# Patient Record
Sex: MALE | Race: WHITE | NOT HISPANIC OR LATINO | Employment: OTHER | URBAN - METROPOLITAN AREA
[De-identification: names, ages, dates, MRNs, and addresses within clinical notes are randomized per-mention and may not be internally consistent; named-entity substitution may affect disease eponyms.]

---

## 2017-09-19 ENCOUNTER — TRANSFERRED RECORDS (OUTPATIENT)
Dept: HEALTH INFORMATION MANAGEMENT | Facility: CLINIC | Age: 72
End: 2017-09-19

## 2017-09-19 ENCOUNTER — TELEPHONE (OUTPATIENT)
Dept: OPHTHALMOLOGY | Facility: CLINIC | Age: 72
End: 2017-09-19

## 2017-09-19 NOTE — TELEPHONE ENCOUNTER
Pt referred by dr. Mills for retina hemorrhage and possible TPA/Gas bubble  Dr. meneses reviewed with dr. Mills  Pt scheduled this Friday (per dr. Meneses)  Pt aware of date/time/location and has main number for generalized questions  Brennan Mccauley RN 4:13 PM 09/19/17

## 2017-09-22 ENCOUNTER — OFFICE VISIT (OUTPATIENT)
Dept: OPHTHALMOLOGY | Facility: CLINIC | Age: 72
End: 2017-09-22
Attending: OPHTHALMOLOGY
Payer: COMMERCIAL

## 2017-09-22 DIAGNOSIS — Z96.1 PSEUDOPHAKIA OF BOTH EYES: ICD-10-CM

## 2017-09-22 DIAGNOSIS — H40.001 GLAUCOMA SUSPECT OF RIGHT EYE: ICD-10-CM

## 2017-09-22 DIAGNOSIS — H35.3231 EXUDATIVE AGE-RELATED MACULAR DEGENERATION, BILATERAL, WITH ACTIVE CHOROIDAL NEOVASCULARIZATION (H): Primary | ICD-10-CM

## 2017-09-22 PROCEDURE — 99214 OFFICE O/P EST MOD 30 MIN: CPT | Mod: ZF

## 2017-09-22 PROCEDURE — 92133 CPTRZD OPH DX IMG PST SGM ON: CPT | Mod: ZF | Performed by: OPHTHALMOLOGY

## 2017-09-22 PROCEDURE — 92250 FUNDUS PHOTOGRAPHY W/I&R: CPT | Mod: ZF | Performed by: OPHTHALMOLOGY

## 2017-09-22 PROCEDURE — 92240 ICG ANGIOGRAPHY I&R UNI/BI: CPT | Mod: ZF | Performed by: OPHTHALMOLOGY

## 2017-09-22 PROCEDURE — 92235 FLUORESCEIN ANGRPH MLTIFRAME: CPT | Mod: ZF | Performed by: OPHTHALMOLOGY

## 2017-09-22 PROCEDURE — 65800 DRAINAGE OF EYE: CPT | Mod: ZF | Performed by: OPHTHALMOLOGY

## 2017-09-22 PROCEDURE — 67028 INJECTION EYE DRUG: CPT | Mod: ZF | Performed by: OPHTHALMOLOGY

## 2017-09-22 PROCEDURE — 92134 CPTRZ OPH DX IMG PST SGM RTA: CPT | Mod: ZF | Performed by: OPHTHALMOLOGY

## 2017-09-22 PROCEDURE — 25000128 H RX IP 250 OP 636: Mod: ZF

## 2017-09-22 RX ORDER — HYDROXYUREA 500 MG/1
CAPSULE ORAL
COMMUNITY
Start: 2017-08-07

## 2017-09-22 RX ORDER — LISINOPRIL 20 MG/1
10 TABLET ORAL
COMMUNITY

## 2017-09-22 RX ORDER — NITROGLYCERIN 120 MG/1
1 PATCH TRANSDERMAL
COMMUNITY

## 2017-09-22 RX ORDER — OXYBUTYNIN CHLORIDE 5 MG/1
5 TABLET ORAL
COMMUNITY

## 2017-09-22 RX ORDER — PRAVASTATIN SODIUM 40 MG
40 TABLET ORAL
COMMUNITY

## 2017-09-22 RX ORDER — TRAZODONE HYDROCHLORIDE 100 MG/1
400 TABLET ORAL
COMMUNITY

## 2017-09-22 RX ORDER — LORAZEPAM 2 MG/1
2 TABLET ORAL
COMMUNITY

## 2017-09-22 RX ORDER — DULOXETIN HYDROCHLORIDE 60 MG/1
60 CAPSULE, DELAYED RELEASE ORAL
COMMUNITY

## 2017-09-22 RX ORDER — CLOPIDOGREL BISULFATE 75 MG/1
75 TABLET ORAL
COMMUNITY

## 2017-09-22 RX ORDER — TRIAMCINOLONE ACETONIDE 1 MG/G
OINTMENT TOPICAL
COMMUNITY

## 2017-09-22 RX ORDER — CARVEDILOL 25 MG/1
25 TABLET ORAL
COMMUNITY

## 2017-09-22 RX ORDER — LISINOPRIL 2.5 MG/1
2.5 TABLET ORAL
COMMUNITY
Start: 2015-09-09

## 2017-09-22 ASSESSMENT — CUP TO DISC RATIO
OS_RATIO: 0.4
OD_RATIO: 0.5

## 2017-09-22 ASSESSMENT — EXTERNAL EXAM - LEFT EYE: OS_EXAM: NORMAL

## 2017-09-22 ASSESSMENT — VISUAL ACUITY
CORRECTION_TYPE: GLASSES
OD_SC: 2/200
OS_SC: 20/250
METHOD: SNELLEN - LINEAR

## 2017-09-22 ASSESSMENT — CONF VISUAL FIELD
OD_INFERIOR_TEMPORAL_RESTRICTION: 1
OD_INFERIOR_NASAL_RESTRICTION: 3
OS_SUPERIOR_NASAL_RESTRICTION: 3
OS_INFERIOR_TEMPORAL_RESTRICTION: 1
OD_SUPERIOR_TEMPORAL_RESTRICTION: 1
OS_SUPERIOR_TEMPORAL_RESTRICTION: 3
OS_INFERIOR_NASAL_RESTRICTION: 1
OD_SUPERIOR_NASAL_RESTRICTION: 3

## 2017-09-22 ASSESSMENT — TONOMETRY
IOP_METHOD: TONOPEN
OD_IOP_MMHG: 15
OS_IOP_MMHG: 16

## 2017-09-22 ASSESSMENT — SLIT LAMP EXAM - LIDS
COMMENTS: NORMAL
COMMENTS: NORMAL

## 2017-09-22 ASSESSMENT — EXTERNAL EXAM - RIGHT EYE: OD_EXAM: NORMAL

## 2017-09-22 NOTE — MR AVS SNAPSHOT
After Visit Summary   2017    Justin Vizcaino    MRN: [id number]           Patient Information     Date Of Birth          1945        Visit Information        Provider Department      2017 8:15 AM Sulema Fraga [facility]        Today's Diagnoses     Exudative age-related macular degeneration, bilateral, with active choroidal neovascularization (H)    -  1    Pseudophakia of both eyes        Glaucoma suspect of right eye           Follow-ups after your visit        Who to contact     Please call your clinic at [phone] to:    Ask questions about your health    Make or cancel appointments    Discuss your medicines    Learn about your test results    Speak to your doctor   If you have compliments or concerns about an experience at your clinic, or if you wish to file a complaint, please contact [facility] Patient Relations at [phone] or email us at [email or web address]         Additional Information About Your Visit        MyChart Information     [employer] is an electronic gateway that provides easy, online access to your medical records. With [employer], you can request a clinic appointment, read your test results, renew a prescription or communicate with your care team.     To sign up for [employer] visit the website at [email or web address]   You will be asked to enter the access code listed below, as well as some personal information. Please follow the directions to create your username and password.     Your access code is: [email or web address]  Expires: 2017  6:31 AM     Your access code will  in 90 days. If you need help or a new code, please contact your [facility] or call [phone] for assistance.        Care EveryWhere ID     This is your Care EveryWhere ID. This could be used by other organizations to access your [facility] medical records  [id number]         Blood Pressure from Last  3 Encounters:   No data found for BP    Weight from Last 3 Encounters:   No data found for Wt              We Performed the Following     Fluorescein Angiography OU (both eyes)     Fundus Photos OU (both eyes)     ICG Angiography OU (both eyes)     Intravitreal Injection OD     OCT Optic Nerve RNFL Spectralis OU (both eyes)     OCT Retina Spectralis OU (both eyes)          Today's Medication Changes          These changes are accurate as of: 9/22/17 12:23 PM.  If you have any questions, ask your nurse or doctor.               Start taking these medicines.        Dose/Directions    alteplase 0.05 MG/0.1 ML Inj   Used for:  Pseudophakia of both eyes, Exudative age-related macular degeneration, bilateral, with active choroidal neovascularization (H), Glaucoma suspect of right eye   Started by:  Sulema Fraga MD        Dose:  0.1 mL   Inject 0.1 mLs (0.05 mg) into the eye once for 1 dose   Quantity:  0.1 mL   Refills:  0            Where to get your medicines      Some of these will need a paper prescription and others can be bought over the counter.  Ask your nurse if you have questions.     Bring a paper prescription for each of these medications     alteplase 0.05 MG/0.1 ML Inj                Primary Care Provider    Physician No Ref-Primary       No address on file        Equal Access to Services     YASH CAMACHO AH: Eden Salgado, waglen wagoner, qazuly kaalmada vikas, aakash ordaz. So [facility] [phone].    ATENCIÓN: Si habla español, tiene a miller disposición servicios gratuitos de asistencia lingüística. Llame al [phone].    We comply with applicable federal civil rights laws and [place] laws. We do not discriminate on the basis of race, color, national origin, age, disability sex, sexual orientation or gender identity.            Thank you!     Thank you for choosing EYE CLINIC  for your care. Our goal is always to provide you with excellent care.  Hearing back from our patients is one way we can continue to improve our services. Please take a few minutes to complete the written survey that you may receive in the mail after your visit with us. Thank you!             Your Updated Medication List - Protect others around you: Learn how to safely use, store and throw away your medicines at www.disposemymeds.org.          This list is accurate as of: 9/22/17 12:23 PM.  Always use your most recent med list.                   Brand Name Dispense Instructions for use Diagnosis    alteplase 0.05 MG/0.1 ML Inj     0.1 mL    Inject 0.1 mLs (0.05 mg) into the eye once for 1 dose    Pseudophakia of both eyes, Exudative age-related macular degeneration, bilateral, with active choroidal neovascularization (H), Glaucoma suspect of right eye       aspirin 81 MG EC tablet      Take 162 mg by mouth as needed        B-12 1000 MCG Caps      Take 1,000 mcg by mouth        carvedilol 25 MG tablet    COREG     Take 25 mg by mouth        clopidogrel 75 MG tablet    PLAVIX     Take 75 mg by mouth        DULoxetine 60 MG EC capsule    CYMBALTA     Take 60 mg by mouth        hydroxyurea 500 MG capsule CHEMO    HYDREA     Do not crush. Take one capsule by mouth twice a day and an additional 500 mg capsule 3 times a week.        * lisinopril 20 MG tablet    PRINIVIL/ZESTRIL     Take 10 mg by mouth        * lisinopril 2.5 MG tablet    PRINIVIL/Zestril     Take 2.5 mg by mouth        LORazepam 2 MG tablet    ATIVAN     Take 2 mg by mouth        nitroGLYcerin 0.6 MG/HR 24 hr patch    NITRODUR     1 patch        oxybutynin 5 MG tablet    DITROPAN     Take 5 mg by mouth        pravastatin 40 MG tablet    PRAVACHOL     Take 40 mg by mouth        traZODone 100 MG tablet    DESYREL     Take 400 mg by mouth        triamcinolone 0.1 % ointment    KENALOG          * Notice:  This list has 2 medication(s) that are the same as other medications prescribed for you. Read the directions carefully, and ask  your doctor or other care provider to review them with you.

## 2017-09-22 NOTE — PROGRESS NOTES
CC -   Wet AMD     INTERVAL HISTORY - Initial visit.  Referred by Dr. Mills for SRH OD.  Noted severe vision loss 9/16/17 OD, found to have central SRH on visit to Dr. Mills when seen 9/19.  Had been receiving Avastin OS monthly x 6 months, was due to get shot 9/14/17 OS but decided to observe given lack of change or improvement.  Had Avastin OD by Gene 9/17/17    HPI -   Justin Vizcaino is a  72 year old year-old patient referred by Dr Leonel Mills for subretinal hemorrhage OD.  Had Wet AMD OS was receiving monthly Avastin.  On plavix for cardiac disease.  PMH: Leukemia on Hydroxyurea  Medications: Plavix, ASA      PAST OCULAR SURGERY  LASIK OU  ~2012   CEIOL OU ~2012    RETINAL IMAGING:  OCT 09/22/17   OD - Subretinal hemorrhage and Sub RPE fluid  OS - Fibrovascular PED, mild SR lucency no definite fluid    FA 9/22/17  OD - (transit) normal filling,  mild late leakage in area of blocking from SRH  OS - normal filling, staining/leakage from FVPD    ICG  OD - (transit) normal filling, blockage from SRH, no leakage/no polyps  OS - normal filling, leakage from CNVM in macula, no polyps    OCT RNFL 09/22/17  OD: wnl  OS: wnl    FAF 09/22/17   OD: hypofluorescence in area of hemorrhage  OS: hyperfluorescence/irregular consistent with FVPED    Fundus photos 09/22/17  Consistent with exam      ASSESSMENT & PLAN    1. Wet AMD OD with SRH   - new onset 9/16/17 by history   - s/p Avastin 9/19/17      - OD was better eye, now severely decreased VA   - Discussed rba of tpa/gas   - RD, need for surgeries, vision loss, blindness   - gas bubble restrictions     - Patient elects to proceed      2. Wet AMD OS   - may be end stage   - s/p Avastin monthly by Gene x 6   - last injection (#6) ~8/2017 (~1 month per patient)   - no subjective or objective change per patient   - no fluid on OCT today   - observing for now      3. PVD OU   - S/Sx RD      4. Pseudophakia with some PCO OU     -Monitor      5.  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  I personally obtained the chief complaint(s) and history of present illness.  I confirmed and edited as necessary the review of systems, past medical/surgical history, family history, social history, and examination findings as documented by others; and I examined the patient myself.  I personally reviewed the relevant tests, images, and reports as documented above.  I formulated and edited as necessary the assessment and plan and discussed the findings and management plan with the patient and family and I was present for the entire procedure performed by the resident/fellow.        Sulema Fraga MD, PhD  , Vitreoretinal Surgery  Department of Ophthalmology  [facility]

## 2017-09-22 NOTE — LETTER
9/22/2017       RE: Justin Vizcaino  [address] [place] [id number]     Dear Colleague,    Thank you for referring your patient, Justin Vizcaino, [facility] Please see a copy of my visit note below.    CC -   Wet AMD     INTERVAL HISTORY - Initial visit.  Referred by Dr. Mills for SRH OD.  Noted severe vision loss 9/16/17 OD, found to have central SRH on visit to Dr. Mills when seen 9/19.  Had been receiving Avastin OS monthly x 6 months, was due to get shot 9/14/17 OS but decided to observe given lack of change or improvement.  Had Avastin OD by Gene 9/17/17    HPI -   Justin Vizcaino is a  72 year old year-old patient referred by Dr Leonel Mills for subretinal hemorrhage OD.  Had Wet AMD OS was receiving monthly Avastin.  On plavix for cardiac disease.  PMH: Leukemia on Hydroxyurea  Medications: Plavix, ASA      PAST OCULAR SURGERY  LASIK OU  ~2012   CEIOL OU ~2012    RETINAL IMAGING:  OCT 09/22/17   OD - Subretinal hemorrhage and Sub RPE fluid  OS - Fibrovascular PED, mild SR lucency no definite fluid    FA 9/22/17  OD - (transit) normal filling,  mild late leakage in area of blocking from SRH  OS - normal filling, staining/leakage from FVPD    ICG  OD - (transit) normal filling, blockage from SRH, no leakage/no polyps  OS - normal filling, leakage from CNVM in macula, no polyps    OCT RNFL 09/22/17  OD: wnl  OS: wnl    FAF 09/22/17   OD: hypofluorescence in area of hemorrhage  OS: hyperfluorescence/irregular consistent with FVPED    Fundus photos 09/22/17  Consistent with exam      ASSESSMENT & PLAN    1. Wet AMD OD with SRH   - new onset 9/16/17 by history   - s/p Avastin 9/19/17      - OD was better eye, now severely decreased VA   - Discussed rba of tpa/gas   - RD, need for surgeries, vision loss, blindness   - gas bubble restrictions     - Patient elects to proceed    2. Wet AMD OS   - may be end stage   - s/p Avastin monthly by  Gene x 6   - last injection (#6) ~8/2017 (~1 month per patient)   - no subjective or objective change per patient   - no fluid on OCT today   - observing for now    3. PVD OU   - S/Sx RD    4. Pseudophakia with some PCO OU     -Monitor  5.  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  I personally obtained the chief complaint(s) and history of present illness.  I confirmed and edited as necessary the review of systems, past medical/surgical history, family history, social history, and examination findings as documented by others; and I examined the patient myself.  I personally reviewed the relevant tests, images, and reports as documented above.  I formulated and edited as necessary the assessment and plan and discussed the findings and management plan with the patient and family and I was present for the entire procedure performed by the resident/fellow.  Sulema Fraga MD, PhD      Again, thank you for allowing me to participate in the care of your patient.      Sincerely,    Sulema Fraga MD

## 2017-09-22 NOTE — LETTER
9/22/2017      RE: Justin Vizcaino  [address] [place] [id number]       CC -   Wet AMD     INTERVAL HISTORY - Initial visit.  Referred by Dr. Mills for SRH OD.  Noted severe vision loss 9/16/17 OD, found to have central SRH on visit to Dr. Mills when seen 9/19.  Had been receiving Avastin OS monthly x 6 months, was due to get shot 9/14/17 OS but decided to observe given lack of change or improvement.  Had Avastin OD by Gene 9/17/17    HPI -   Justinkaylah Vizcaino is a  72 year old year-old patient referred by Dr Leonel Mills for subretinal hemorrhage OD.  Had Wet AMD OS was receiving monthly Avastin.  On plavix for cardiac disease.  PMH: Leukemia on Hydroxyurea  Medications: Plavix, ASA      PAST OCULAR SURGERY  LASIK OU  ~2012   CEIOL OU ~2012    RETINAL IMAGING:  OCT 09/22/17   OD - Subretinal hemorrhage and Sub RPE fluid  OS - Fibrovascular PED, mild SR lucency no definite fluid    FA 9/22/17  OD - (transit) normal filling,  mild late leakage in area of blocking from SRH  OS - normal filling, staining/leakage from FVPD    ICG  OD - (transit) normal filling, blockage from SRH, no leakage/no polyps  OS - normal filling, leakage from CNVM in macula, no polyps    OCT RNFL 09/22/17  OD: wnl  OS: wnl    FAF 09/22/17   OD: hypofluorescence in area of hemorrhage  OS: hyperfluorescence/irregular consistent with FVPED    Fundus photos 09/22/17  Consistent with exam      ASSESSMENT & PLAN    1. Wet AMD OD with SRH   - new onset 9/16/17 by history   - s/p Avastin 9/19/17      - OD was better eye, now severely decreased VA   - Discussed rba of tpa/gas   - RD, need for surgeries, vision loss, blindness   - gas bubble restrictions     - Patient elects to proceed      2. Wet AMD OS   - may be end stage   - s/p Avastin monthly by Gene x 6   - last injection (#6) ~8/2017 (~1 month per patient)   - no subjective or objective change per patient   - no fluid on OCT today   - observing for now      3. PVD OU   - S/Sx  RD      4. Pseudophakia with some PCO OU     -Monitor      5.  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  I personally obtained the chief complaint(s) and history of present illness.  I confirmed and edited as necessary the review of systems, past medical/surgical history, family history, social history, and examination findings as documented by others; and I examined the patient myself.  I personally reviewed the relevant tests, images, and reports as documented above.  I formulated and edited as necessary the assessment and plan and discussed the findings and management plan with the patient and family and I was present for the entire procedure performed by the resident/fellow. Sulema Fraga MD, PhD      Sulema Fraga MD, PhD  , Vitreoretinal Surgery  Department of Ophthalmology & Visual Neurosciences  [facility]

## 2017-09-22 NOTE — NURSING NOTE
Chief Complaints and History of Present Illnesses   Patient presents with     Retinal Evaluation     Retina hemorrhage and possible TPA/Gas bubble Right Eye     HPI    Affected eye(s):  Right   Symptoms:           Do you have eye pain now?:  No      Comments:  Pt here for Retinal Hemorrhage in RE. Pt woke up on Saturday morning with no vision in his RE. No eye pain. No flashes or floaters. Pt states that his LE has had limited vision for the past 9 months. Pt has been having regular injections in LE, last injection was 6 weeks ago.    Sheela DUNCAN September 22, 2017 8:17 AM

## 2017-09-25 ENCOUNTER — TRANSFERRED RECORDS (OUTPATIENT)
Dept: HEALTH INFORMATION MANAGEMENT | Facility: CLINIC | Age: 72
End: 2017-09-25

## 2017-11-17 ENCOUNTER — TRANSFERRED RECORDS (OUTPATIENT)
Dept: HEALTH INFORMATION MANAGEMENT | Facility: CLINIC | Age: 72
End: 2017-11-17

## 2017-11-17 ENCOUNTER — MEDICAL CORRESPONDENCE (OUTPATIENT)
Dept: HEALTH INFORMATION MANAGEMENT | Facility: CLINIC | Age: 72
End: 2017-11-17

## 2017-11-29 ENCOUNTER — TRANSFERRED RECORDS (OUTPATIENT)
Dept: HEALTH INFORMATION MANAGEMENT | Facility: CLINIC | Age: 72
End: 2017-11-29

## 2017-11-30 ENCOUNTER — MEDICAL CORRESPONDENCE (OUTPATIENT)
Dept: HEALTH INFORMATION MANAGEMENT | Facility: CLINIC | Age: 72
End: 2017-11-30

## 2017-11-30 ENCOUNTER — TRANSFERRED RECORDS (OUTPATIENT)
Dept: HEALTH INFORMATION MANAGEMENT | Facility: CLINIC | Age: 72
End: 2017-11-30

## 2017-12-04 ENCOUNTER — OFFICE VISIT (OUTPATIENT)
Dept: OPHTHALMOLOGY | Facility: CLINIC | Age: 72
End: 2017-12-04
Attending: OPHTHALMOLOGY

## 2017-12-04 DIAGNOSIS — H35.3211 EXUDATIVE AGE-RELATED MACULAR DEGENERATION OF RIGHT EYE WITH ACTIVE CHOROIDAL NEOVASCULARIZATION (H): ICD-10-CM

## 2017-12-04 DIAGNOSIS — H35.61 SUBRETINAL HEMORRHAGE OF RIGHT EYE: Primary | ICD-10-CM

## 2017-12-04 PROCEDURE — 99212 OFFICE O/P EST SF 10 MIN: CPT | Mod: 25,ZF

## 2017-12-04 PROCEDURE — 67028 INJECTION EYE DRUG: CPT | Mod: ZF | Performed by: OPHTHALMOLOGY

## 2017-12-04 PROCEDURE — 92134 CPTRZ OPH DX IMG PST SGM RTA: CPT | Mod: ZF | Performed by: OPHTHALMOLOGY

## 2017-12-04 PROCEDURE — 92250 FUNDUS PHOTOGRAPHY W/I&R: CPT | Mod: 59,ZF | Performed by: OPHTHALMOLOGY

## 2017-12-04 PROCEDURE — 25000128 H RX IP 250 OP 636: Mod: ZF

## 2017-12-04 RX ORDER — LIDOCAINE HYDROCHLORIDE 20 MG/ML
0.05 INJECTION, SOLUTION INFILTRATION; PERINEURAL ONCE
Status: ACTIVE | OUTPATIENT
Start: 2017-12-04

## 2017-12-04 ASSESSMENT — VISUAL ACUITY
OD_SC: HM
METHOD: SNELLEN - LINEAR
OS_SC: 20/200

## 2017-12-04 ASSESSMENT — TONOMETRY
IOP_METHOD: TONOPEN
OS_IOP_MMHG: 20
OD_IOP_MMHG: 18

## 2017-12-04 ASSESSMENT — CUP TO DISC RATIO
OD_RATIO: 0.5
OS_RATIO: 0.4

## 2017-12-04 ASSESSMENT — EXTERNAL EXAM - RIGHT EYE: OD_EXAM: NORMAL

## 2017-12-04 ASSESSMENT — SLIT LAMP EXAM - LIDS
COMMENTS: NORMAL
COMMENTS: NORMAL

## 2017-12-04 ASSESSMENT — EXTERNAL EXAM - LEFT EYE: OS_EXAM: NORMAL

## 2017-12-04 NOTE — LETTER
12/4/2017       RE: Justin Vizcaino  [address] [place] [id number]     Dear Colleague,    Thank you for referring your patient, Justin Vizcaino, to the EYE CLINIC at [facility]. Please see a copy of my visit note below.    CC -   Wet AMD and SRH OD       INTERVAL HISTORY - Pt seen by Dr Mills 11-29-17 and found to have another bleed that started on 11-26-17.  VA had improved to 20/150 after last pneumatic Tx then worsened suddenly.  Had Avastin 2 weeks prior by Gene, planned again in 2 weeks.        HPI -   Justin Vizcaino is a  72 year old year-old patient referred by Dr Leonel Mills for recurrent subretinal hemorrhage OD.  Initial SRH 9/22/17 Tx with TPA & C3F8 in clinic, VA was 2/200 after 1st SRH, VA improved to 20/150 after first Tx, but had recurrence and VA reduced to HM.  Has Wet AMD OS was receiving monthly Avastin.  On plavix for cardiac disease.  PMH: Leukemia on Hydroxyurea   Medications: Plavix, ASA    PAST OCULAR SURGERY  LASIK OU  ~2012   CEIOL OU ~2012    RETINAL IMAGING:  OCT 12/4/2017  OD - large SRH and small PED under macula (?FV vs heme PED)  OS - Fibrovascular PED, mild SR lucency no definite fluid    FA 9/22/17  OD - (transit) normal filling,  mild late leakage in area of blocking from SRH  OS - normal filling, staining/leakage from FVPD    ICG  OD - (transit) normal filling, blockage from SRH, no leakage/no polyps  OS - normal filling, leakage from CNVM in macula, no polyps    OCT RNFL 09/22/17  OD: wnl  OS: wnl    FAF 09/22/17   OD: hypofluorescence in area of hemorrhage  OS: hyperfluorescence/irregular consistent with FVPED      ASSESSMENT & PLAN    1. Wet AMD OD with SRH OD x 2   - first onset 9/16/17 by history   - s/p Avastin 9/19/17   -S/p C3F8 + TPA 9/22/172017   - VA improved to 20/150     - 2nd episode 11/26/17   - VA reduced to HM   - OD was better eye, now severely decreased VA again   - Discussed rba of tpa/gas    - RD, need for surgeries,  vision loss, blindness   - gas bubble restrictions   - Patient elects to proceed     - Advise Avastin in 1 week if possible   - f/u tomorrow with Gene      2. Wet AMD OS   - may be end stage   - s/p Avastin monthly by Gene x 6   - last injection (#6) ~8/2017 (~1 month per patient)   - no subjective or objective change per patient   - no fluid on OCT today   - observing for now      3. PVD OU   - S/Sx RD      4. Pseudophakia with some PCO OU     -Monitor      5.  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 Gene Leone MD  Ophthalmology PGY3      ATTESTATION   Attending Physician Attestation: Complete documentation of historical and exam elements from today's encounter can be found in the full encounter summary report (not reduplicated in this progress note).  I personally obtained the chief complaint(s) and history of present illness.  I confirmed and edited as necessary the review of systems, past medical/surgical history, family history, social history, and examination findings as documented by others; and I examined the patient myself.  I personally reviewed the relevant tests, images, and reports as documented above.  I formulated and edited as necessary the assessment and plan and discussed the findings and management plan with the patient and family and No resident or fellow assisted with the procedures performed.  I performed the procedures myself. Sulema Fraga MD, PhD      Base Eye Exam     Visual Acuity (Snellen - Linear)      Right Left   Dist sc HM 20/200         Tonometry (Tonopen, 10:04 AM)      Right Left   Pressure 18 20         Pupils      Pupils APD   Right PERRL None   Left PERRL None         Visual Fields      Left Right   Restrictions  Central scotoma         Extraocular Movement      Right Left   Result Full Full         Neuro/Psych     Oriented x3:  Yes    Mood/Affect:  Normal      Dilation     Both eyes:  1.0% Mydriacyl,  2.5% Kenny Synephrine @ 10:04 AM            Slit Lamp and Fundus Exam     External Exam      Right Left    External Normal Normal      Slit Lamp Exam      Right Left    Lids/Lashes Normal Normal    Conjunctiva/Sclera White and quiet White and quiet    Cornea Clear Clear    Anterior Chamber Deep and quiet Deep and quiet    Iris Dilated Dilated    Lens PCIOL with tr PCO PCIOL with tr PCO    Vitreous PVD PVD      Fundus Exam      Right Left    Disc Normal, Normal    C/D Ratio 0.5 0.4    Macula Central dense SRH 3 DD  FVPED, atrophic    Vessels Normal Normal    Periphery inferior yellow focal area of atrophy? Normal                Again, thank you for allowing me to participate in the care of your patient.      Sincerely,    Sulema Fraga MD

## 2017-12-04 NOTE — NURSING NOTE
Chief Complaints and History of Present Illnesses   Patient presents with     Consult For     retinal hemorrhage RE     HPI    Affected eye(s):  Right   Symptoms:     No floaters   No flashes   No glare   No halos         Do you have eye pain now?:  No      Comments:  Hemorrhage RE X1 week   Referred by Dr Mills/ [facility] Sarah Quiñones COA 9:58 AM December 4, 2017

## 2017-12-04 NOTE — PROGRESS NOTES
CC -   Wet AMD and SRH OD       INTERVAL HISTORY - Pt seen by Dr Mills 11-29-17 and found to have another bleed that started on 11-26-17.  VA had improved to 20/150 after last pneumatic Tx then worsened suddenly.  Had Avastin 2 weeks prior by Gene, planned again in 2 weeks.        HPI -   Justin Vizcaino is a  72 year old year-old patient referred by Dr Leonel Mills for recurrent subretinal hemorrhage OD.  Initial SRH 9/22/17 Tx with TPA & C3F8 in clinic, VA was 2/200 after 1st SRH, VA improved to 20/150 after first Tx, but had recurrence and VA reduced to HM.  Has Wet AMD OS was receiving monthly Avastin.  On plavix for cardiac disease.  PMH: Leukemia on Hydroxyurea   Medications: Plavix, ASA    PAST OCULAR SURGERY  LASIK OU  ~2012   CEIOL OU ~2012    RETINAL IMAGING:  OCT 12/4/2017  OD - large SRH and small PED under macula (?FV vs heme PED)  OS - Fibrovascular PED, mild SR lucency no definite fluid    FA 9/22/17  OD - (transit) normal filling,  mild late leakage in area of blocking from SRH  OS - normal filling, staining/leakage from FVPD    ICG  OD - (transit) normal filling, blockage from SRH, no leakage/no polyps  OS - normal filling, leakage from CNVM in macula, no polyps    OCT RNFL 09/22/17  OD: wnl  OS: wnl    FAF 09/22/17   OD: hypofluorescence in area of hemorrhage  OS: hyperfluorescence/irregular consistent with FVPED      ASSESSMENT & PLAN    1. Wet AMD OD with SRH OD x 2   - first onset 9/16/17 by history   - s/p Avastin 9/19/17   -S/p C3F8 + TPA 9/22/172017   - VA improved to 20/150     - 2nd episode 11/26/17   - VA reduced to HM   - OD was better eye, now severely decreased VA again   - Discussed rba of tpa/gas    - RD, need for surgeries, vision loss, blindness   - gas bubble restrictions   - Patient elects to proceed     - Advise Avastin in 1 week if possible   - f/u tomorrow with Gene      2. Wet AMD OS   - may be end stage   - s/p Avastin monthly by Gene x 6   - last injection (#6)  ~8/2017 (~1 month per patient)   - no subjective or objective change per patient   - no fluid on OCT today   - observing for now      3. PVD OU   - S/Sx RD      4. Pseudophakia with some PCO OU     -Monitor      5.  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 Gene Leone MD  Ophthalmology PGY3      ATTESTATION     Attending Physician Attestation:      Complete documentation of historical and exam elements from today's encounter can be found in the full encounter summary report (not reduplicated in this progress note).  I personally obtained the chief complaint(s) and history of present illness.  I confirmed and edited as necessary the review of systems, past medical/surgical history, family history, social history, and examination findings as documented by others; and I examined the patient myself.  I personally reviewed the relevant tests, images, and reports as documented above.  I formulated and edited as necessary the assessment and plan and discussed the findings and management plan with the patient and family and No resident or fellow assisted with the procedures performed.  I performed the procedures myself.    Sulema Fraga MD, PhD  , Vitreoretinal Surgery  Department of Ophthalmology  [facility]

## 2017-12-04 NOTE — MR AVS SNAPSHOT
After Visit Summary   2017    Justin Vizcaino    MRN: [id number]           Patient Information     Date Of Birth          1945        Visit Information        Provider Department      2017 9:45 AM Sule[facility]        Today's Diagnoses     Subretinal hemorrhage of right eye    -  1    Exudative age-related macular degeneration of right eye with active choroidal neovascularization (H)           Follow-ups after your visit        Who to contact     Please call your clinic at [phone] to:    Ask questions about your health    Make or cancel appointments    Discuss your medicines    Learn about your test results    Speak to your doctor   If you have compliments or concerns about an experience at your clinic, or if you wish to file a complaint, please contact [facility] Patient Relations at [phone] or email us at [email or web address]         Additional Information About Your Visit        MyChart Information     [employer] is an electronic gateway that provides easy, online access to your medical records. With [employer], you can request a clinic appointment, read your test results, renew a prescription or communicate with your care team.     To sign up for [employer] visit the website at [email or web address]   You will be asked to enter the access code listed below, as well as some personal information. Please follow the directions to create your username and password.     Your access code is: [email or web address]  Expires: 2017  5:31 AM     Your access code will  in 90 days. If you need help or a new code, please contact your [facility] or call [phone] for assistance.        Care EveryWhere ID     This is your Care EveryWhere ID. This could be used by other organizations to access your [facility] medical records  [id number]         Blood Pressure from Last 3 Encounters:   No data  found for BP    Weight from Last 3 Encounters:   No data found for Wt              We Performed the Following     Fundus Photos OU (both eyes)     Intravitreal Injection OD     OCT Retina Spectralis OU (both eyes)          Today's Medication Changes          These changes are accurate as of: 12/4/17  2:08 PM.  If you have any questions, ask your nurse or doctor.               Start taking these medicines.        Dose/Directions    alteplase 0.05 MG/0.1 ML Inj   Used for:  Subretinal hemorrhage of right eye   Started by:  Sulema Fraga MD        Dose:  0.1 mL   Inject 0.1 mLs (0.05 mg) into the eye once for 1 dose   Quantity:  0.1 mL   Refills:  0            Where to get your medicines      Some of these will need a paper prescription and others can be bought over the counter.  Ask your nurse if you have questions.     Bring a paper prescription for each of these medications     alteplase 0.05 MG/0.1 ML Inj                Primary Care Provider Fax #    Physician No Ref-Primary [phone]       No address on file        Equal Access to Services     YASH CAMACHO : Hadii nighat pengo Sovictorinoali, waaxda luqadaha, qaybta kaalmada adeegyada, aakash ramirez . So [facility] [phone].    ATENCIÓN: Si habla español, tiene a miller disposición servicios gratuitos de asistencia lingüística. Llame al [phone].    We comply with applicable federal civil rights laws and [place] laws. We do not discriminate on the basis of race, color, national origin, age, disability, sex, sexual orientation, or gender identity.            Thank you!     Thank you for choosing [facility]  for your care. Our goal is always to provide you with excellent care. Hearing back from our patients is one way we can continue to improve our services. Please take a few minutes to complete the written survey that you may receive in the mail after your visit with us. Thank you!             Your Updated Medication List -  Protect others around you: Learn how to safely use, store and throw away your medicines at www.disposemymeds.org.          This list is accurate as of: 12/4/17  2:08 PM.  Always use your most recent med list.                   Brand Name Dispense Instructions for use Diagnosis    alteplase 0.05 MG/0.1 ML Inj     0.1 mL    Inject 0.1 mLs (0.05 mg) into the eye once for 1 dose    Subretinal hemorrhage of right eye       aspirin 81 MG EC tablet      Take 162 mg by mouth as needed        B-12 1000 MCG Caps      Take 1,000 mcg by mouth        carvedilol 25 MG tablet    COREG     Take 25 mg by mouth        clopidogrel 75 MG tablet    PLAVIX     Take 75 mg by mouth        DULoxetine 60 MG EC capsule    CYMBALTA     Take 60 mg by mouth        hydroxyurea 500 MG capsule CHEMO    HYDREA     Do not crush. Take one capsule by mouth twice a day and an additional 500 mg capsule 3 times a week.        * lisinopril 20 MG tablet    PRINIVIL/ZESTRIL     Take 10 mg by mouth        * lisinopril 2.5 MG tablet    PRINIVIL/Zestril     Take 2.5 mg by mouth        LORazepam 2 MG tablet    ATIVAN     Take 2 mg by mouth        nitroGLYcerin 0.6 MG/HR 24 hr patch    NITRODUR     1 patch        oxybutynin 5 MG tablet    DITROPAN     Take 5 mg by mouth        pravastatin 40 MG tablet    PRAVACHOL     Take 40 mg by mouth        traZODone 100 MG tablet    DESYREL     Take 400 mg by mouth        triamcinolone 0.1 % ointment    KENALOG          * Notice:  This list has 2 medication(s) that are the same as other medications prescribed for you. Read the directions carefully, and ask your doctor or other care provider to review them with you.

## 2021-05-29 ENCOUNTER — RECORDS - HEALTHEAST (OUTPATIENT)
Dept: ADMINISTRATIVE | Facility: CLINIC | Age: 76
End: 2021-05-29

## 2024-08-13 ENCOUNTER — MEDICAL CORRESPONDENCE (OUTPATIENT)
Dept: HEALTH INFORMATION MANAGEMENT | Facility: HOSPITAL | Age: 79
End: 2024-08-13

## 2024-08-20 ENCOUNTER — HOSPITAL ENCOUNTER (OUTPATIENT)
Dept: GENERAL RADIOLOGY | Facility: HOSPITAL | Age: 79
Discharge: HOME | End: 2024-08-20
Attending: OTOLARYNGOLOGY
Payer: COMMERCIAL

## 2024-08-20 ENCOUNTER — THERAPY VISIT (OUTPATIENT)
Dept: SPEECH THERAPY | Facility: HOSPITAL | Age: 79
End: 2024-08-20
Payer: COMMERCIAL

## 2024-08-20 DIAGNOSIS — R13.10 DYSPHAGIA: ICD-10-CM

## 2024-08-20 DIAGNOSIS — K21.9 LARYNGOPHARYNGEAL REFLUX: ICD-10-CM

## 2024-08-20 DIAGNOSIS — K21.9 LARYNGOPHARYNGEAL REFLUX: Primary | ICD-10-CM

## 2024-08-20 PROCEDURE — 74230 X-RAY XM SWLNG FUNCJ C+: CPT

## 2024-08-20 PROCEDURE — 92611 MOTION FLUOROSCOPY/SWALLOW: CPT | Mod: GN

## 2024-08-20 PROCEDURE — 250N000013 HC RX MED GY IP 250 OP 250 PS 637: Performed by: RADIOLOGY

## 2024-08-20 RX ADMIN — ANTACID/ANTIFLATULENT 4 G: 380; 550; 10; 10 GRANULE, EFFERVESCENT ORAL at 09:44

## 2024-08-20 NOTE — PROGRESS NOTES
"SPEECH LANGUAGE PATHOLOGY EVALUATION     Pt is a 79 year old male who was referred for a video swallow assessment after GI expressed concerns of LPR. Pt reported slight difficulty with swallowing liquids and a lot of difficulty with swallowing solids. Pt reported that he feels that solids get stuck in his throat about 1-2x a day during mealtimes. Pt expressed that when he experiences globus sensation, he is not really able to get the feeling to diminish for a while. Pt reported that he \"has not been eating well at all\" and has been avoiding some solid foods due to globus sensation. Pt reported that he has been avoiding meats and breads due to difficulty swallowing.     Fall Risk Screen:  Fall screen completed by: SLP  Have you fallen 2 or more times in the past year?: No  Have you fallen and had an injury in the past year?: No  Is patient a fall risk?: No    Subjective      Presenting condition or subjective complaint:   food getting stuck in throat  Date of onset: 08/08/24    Relevant medical history:   GI concerns  Dates & types of surgery:   NA    Prior diagnostic imaging/testing results:     NA  Prior therapy history for the same diagnosis, illness or injury:     NA    Living Environment  Social support:   none  Help at home:   none  Equipment owned:   cane    Employment:    retired,   Patient goals for therapy:   to not feel like there is something stuck in my throat.    Pain assessment: Pain denied     Objective     SWALLOW EVALUTION  Dysphagia history: Pt reported that he has been experiencing difficulty with solids across mealtimes. Pt reported frequent globus sensation with solids that is difficult to \"get to go away\". Pt reported slight difficulty with liquids, as they sometimes sneak down too fast. Pt reported no previous hx of oropharyngeal dysphagia concerns. Upon chart review, pt did not present with hx of oropharyngeal dysphagia. However, pt does present with hx of esophageal dysphagia " (LPR).  Current Diet/Method of Nutritional Intake: thin liquids (level 0), regular diet        VIDEOFLUOROSCOPIC SWALLOW STUDY  Radiologist: Dr. Ray  Views Taken: left lateral   Physical location of procedure: [facility], Diagnostic Imaging department, Xray room 5  Patient sitting upright on chair/stool     VFSS textures trialed:   VFSS Eval: Thin Liquids  Mode of Presentation: cup, self-fed   Order of Presentation: videos:   Preparatory Phase: poor bolus control  Oral Phase: WFL  Bolus Location When Swallow Initiated: posterior angle of ramus  Pharyngeal Phase: residue in valleculae, Residue on UES  Rosenbeck's Penetration Aspiration Scale: 1 - no aspiration, contrast does not enter airway  Response to Aspiration:  No aspiration was witnessed across trials of thin liquids  Strategies and Compensations: not applicable  Diagnostic Statement: Penetration/aspiration were not witnessed during trials of thin liquids. Pt demonstrated swallow trigger at the posterior angle of ramus across trials presented. Trace residue was witnessed in the valleculae and on UES, however trace residue was determined to be WNL for swallow function. Poor bolus control was witnessed across all trials presented. Complete epiglottic inversion occurred across all trials. Trace residue in valleculae and UES, and poor bolus control may impact pt's efficiency of swallow, however did not impact pt s safety of swallow during the session.     VFSS Eval: Purees  Mode of Presentation: spoon, self-fed   Order of Presentation: Video:   Preparatory Phase: poor bolus control  Oral Phase: WFL  Bolus Location When Swallow Initiated: posterior angle of ramus  Pharyngeal Phase: residue in valleculae, Residue on UES  Rosenbeck s Penetration Aspiration Scale: 1 - no aspiration, contrast does not enter airway  Response to Aspiration:  No aspiration witnessed during trial of pureed consistency  Strategies and Compensations: not  applicable  Diagnostic Statement: Penetration/aspiration were not witnessed during trials of pureed consistency. Pt demonstrated swallow trigger at the posterior angle of ramus across trials presented. Trace residue was witnessed in the valleculae and on UES, however trace residue was determined to be WNL for swallow function. Poor bolus control was witnessed across all trials presented. Complete epiglottic inversion occurred across all trials. Trace residue in valleculae and UES, and poor bolus control may impact pt's efficiency of swallow, however did not impact pt s safety of swallow during the session.     VFSS Eval: Solids  Mode of Presentation: self-fed   Order of Presentation: Videos:   Preparatory Phase: poor bolus control  Oral Phase: WFL  Bolus Location When Swallow Initiated: posterior angle of ramus  Pharyngeal Phase: residue in valleculae, globus sensation, residue on UES    Rosenbeck s Penetration Aspiration Scale: 1 - no aspiration, contrast does not enter airway  Response to Aspiration:  No aspiration was witnessed across trials of regular solids.  Strategies and Compensations: liquid wash  Diagnostic Statement: Penetration/aspiration were not witnessed during trials of regular consistency. Pt demonstrated swallow trigger at the posterior angle of ramus across trials presented. Trace residue was witnessed in the valleculae and on UES, however trace residue was determined to be WNL for swallow function. Poor bolus control was witnessed across all trials presented. Pt reported globus sensation across trials presented. However, when provided with liquid flush, pt reported some diminished sensation. Complete epiglottic inversion occurred across all trials. Trace residue in valleculae and UES, and poor bolus control may impact pt's efficiency of swallow, however did not impact pt s safety of swallow during the session.      ESOPHAGEAL PHASE OF SWALLOW  please refer to radiologist's report for details      SWALLOW ASSESSMENT CLINICAL IMPRESSIONS AND RATIONALE  Diet Consistency Recommendations: thin liquids (level 0), regular diet    Recommended Feeding/Eating Techniques: small bolus size, slow rate of intake, discontinue eating activities if fatigue is present, monitor for cough or change in vocal quality with intake, maintain upright posture during/after eating for 30 minutes   Medication Administration Recommendations: No specific medication administration recommendations via SLP. Refer to PCP regarding specific medication administration recommendations.  Instrumental Assessment Recommendations: instrumental evaluation not recommended at this time     Assessment & Plan   CLINICAL IMPRESSIONS   Medical Diagnosis: R13.10 (ICD-10-CM) - Dysphagia  K21.9 (ICD-10-CM) - Laryngopharyngeal reflux    Treatment Diagnosis: None   Impression/Assessment: Pt is a 79 year old male with complaints of globus sensation and avoidance of advanced textures (breads, meats). Pt was presented with the following consistencies during the session: IDDSI 0, 4, 7.  No aspiration/penetration witnessed across all consistencies presented. Pt demonstrated swallow trigger at the posterior angle of ramus across all consistencies presented. Pt presented with trace residue on the valleculae and on UES across all consistencies. However, trace residue was determined to be WFL at this time. Pt reported globus sensation across trials of IDDSI 7. However, pt reported some diminished globus sensation when provided with liquid flush. Complete epiglottic inversion and poor bolus control was witnessed across all swallows of consistencies presented. Trace residue in valleculae and UES, and poor bolus control may impact pt's efficiency of swallow, however did not impact pt s safety of swallow during the session. Due to pt tolerating all consistencies presented without witnessed pen/asp, SLP recommend IDDSI 0 (thin liquids)/7 (regular diet) with implementation  of alternating bites/sips. SLP recommends discontinuing eating activities if fatigue is present, monitor for cough or change in vocal quality with intake, and maintain upright posture during/after eating for 30 minutes. Skilled speech therapy services are not recommended as the pt is able to tolerate baseline diet, and globus sensation due to esophageal concerns. SLP and pt discussed preliminary VFSS results and discussed implementation of swallow strategies. Pt verbalized understanding and acceptance of VFSS results and recommendations to follow up with GI after esophagram this AM.     PLAN OF CARE  Treatment Interventions:  Not applicable    Prognosis to achieve stated therapy goals is  NA    Rehab potential is impacted by:  NA    Long Term Goals:          Frequency of Treatment: Evaluation Only  Duration of Treatment: Evaluation Only     Recommended Referrals to Other Professionals:  Continue to follow up with PCP and GI regarding globus sensation.  Education Assessment:   Learner/Method: Patient  Education Comments: SLP and pt discussed VFSS results and recommendations to follow up with GI regarding esophagram.    Risks and benefits of evaluation/treatment have been explained.   Patient/Family/caregiver agrees with Plan of Care.     Evaluation Time:    SLP Eval: VideoFluoroscopic Swallow function Minutes (30553): 15     Signing Clinician: MAY Alexander

## 2024-08-26 ENCOUNTER — MEDICAL CORRESPONDENCE (OUTPATIENT)
Dept: HEALTH INFORMATION MANAGEMENT | Facility: HOSPITAL | Age: 79
End: 2024-08-26